# Patient Record
Sex: FEMALE | Race: BLACK OR AFRICAN AMERICAN | Employment: FULL TIME | URBAN - METROPOLITAN AREA
[De-identification: names, ages, dates, MRNs, and addresses within clinical notes are randomized per-mention and may not be internally consistent; named-entity substitution may affect disease eponyms.]

---

## 2018-01-31 ENCOUNTER — HOSPITAL ENCOUNTER (EMERGENCY)
Age: 38
Discharge: HOME | End: 2018-01-31
Attending: EMERGENCY MEDICINE
Payer: COMMERCIAL

## 2018-01-31 VITALS
HEART RATE: 94 BPM | RESPIRATION RATE: 14 BRPM | TEMPERATURE: 98.1 F | BODY MASS INDEX: 28.89 KG/M2 | SYSTOLIC BLOOD PRESSURE: 128 MMHG | DIASTOLIC BLOOD PRESSURE: 90 MMHG | HEIGHT: 65 IN | WEIGHT: 173.4 LBS | OXYGEN SATURATION: 100 %

## 2018-01-31 DIAGNOSIS — R11.2 NON-INTRACTABLE VOMITING WITH NAUSEA, UNSPECIFIED VOMITING TYPE: Primary | ICD-10-CM

## 2018-01-31 DIAGNOSIS — R55 POSTURAL DIZZINESS WITH PRESYNCOPE: ICD-10-CM

## 2018-01-31 DIAGNOSIS — R51.9 NONINTRACTABLE HEADACHE, UNSPECIFIED CHRONICITY PATTERN, UNSPECIFIED HEADACHE TYPE: ICD-10-CM

## 2018-01-31 DIAGNOSIS — R42 POSTURAL DIZZINESS WITH PRESYNCOPE: ICD-10-CM

## 2018-01-31 LAB
ALBUMIN SERPL-MCNC: 4.2 G/DL (ref 3.4–5)
ALBUMIN/GLOB SERPL: 1.2 {RATIO} (ref 0.8–1.7)
ALP SERPL-CCNC: 67 U/L (ref 45–117)
ALT SERPL-CCNC: 17 U/L (ref 13–56)
ANION GAP SERPL CALC-SCNC: 9 MMOL/L (ref 3–18)
APPEARANCE UR: CLEAR
AST SERPL-CCNC: 33 U/L (ref 15–37)
BASOPHILS # BLD: 0 K/UL (ref 0–0.06)
BASOPHILS NFR BLD: 0 % (ref 0–2)
BILIRUB SERPL-MCNC: 0.6 MG/DL (ref 0.2–1)
BILIRUB UR QL: NEGATIVE
BNP SERPL-MCNC: 23 PG/ML (ref 0–450)
BUN SERPL-MCNC: 9 MG/DL (ref 7–18)
BUN/CREAT SERPL: 13 (ref 12–20)
CALCIUM SERPL-MCNC: 9.2 MG/DL (ref 8.5–10.1)
CHLORIDE SERPL-SCNC: 106 MMOL/L (ref 100–108)
CK MB CFR SERPL CALC: NORMAL % (ref 0–4)
CK MB SERPL-MCNC: <1 NG/ML (ref 5–25)
CK SERPL-CCNC: 145 U/L (ref 26–192)
CO2 SERPL-SCNC: 26 MMOL/L (ref 21–32)
COLOR UR: YELLOW
CREAT SERPL-MCNC: 0.7 MG/DL (ref 0.6–1.3)
DIFFERENTIAL METHOD BLD: NORMAL
EOSINOPHIL # BLD: 0.3 K/UL (ref 0–0.4)
EOSINOPHIL NFR BLD: 3 % (ref 0–5)
ERYTHROCYTE [DISTWIDTH] IN BLOOD BY AUTOMATED COUNT: 13.2 % (ref 11.6–14.5)
GLOBULIN SER CALC-MCNC: 3.6 G/DL (ref 2–4)
GLUCOSE SERPL-MCNC: 100 MG/DL (ref 74–99)
GLUCOSE UR STRIP.AUTO-MCNC: NEGATIVE MG/DL
HCT VFR BLD AUTO: 41 % (ref 35–45)
HGB BLD-MCNC: 13.7 G/DL (ref 12–16)
HGB UR QL STRIP: NEGATIVE
KETONES UR QL STRIP.AUTO: NEGATIVE MG/DL
LEUKOCYTE ESTERASE UR QL STRIP.AUTO: NEGATIVE
LIPASE SERPL-CCNC: 91 U/L (ref 73–393)
LYMPHOCYTES # BLD: 2.8 K/UL (ref 0.9–3.6)
LYMPHOCYTES NFR BLD: 32 % (ref 21–52)
MAGNESIUM SERPL-MCNC: 2.3 MG/DL (ref 1.6–2.6)
MCH RBC QN AUTO: 30.4 PG (ref 24–34)
MCHC RBC AUTO-ENTMCNC: 33.4 G/DL (ref 31–37)
MCV RBC AUTO: 91.1 FL (ref 74–97)
MONOCYTES # BLD: 0.6 K/UL (ref 0.05–1.2)
MONOCYTES NFR BLD: 6 % (ref 3–10)
NEUTS SEG # BLD: 5.2 K/UL (ref 1.8–8)
NEUTS SEG NFR BLD: 59 % (ref 40–73)
NITRITE UR QL STRIP.AUTO: NEGATIVE
PH UR STRIP: 7 [PH] (ref 5–8)
PLATELET # BLD AUTO: 273 K/UL (ref 135–420)
PMV BLD AUTO: 9.5 FL (ref 9.2–11.8)
POTASSIUM SERPL-SCNC: 5.5 MMOL/L (ref 3.5–5.5)
PROT SERPL-MCNC: 7.8 G/DL (ref 6.4–8.2)
PROT UR STRIP-MCNC: NEGATIVE MG/DL
RBC # BLD AUTO: 4.5 M/UL (ref 4.2–5.3)
SODIUM SERPL-SCNC: 141 MMOL/L (ref 136–145)
SP GR UR REFRACTOMETRY: 1.01 (ref 1–1.03)
TROPONIN I SERPL-MCNC: <0.02 NG/ML (ref 0–0.06)
UROBILINOGEN UR QL STRIP.AUTO: 0.2 EU/DL (ref 0.2–1)
WBC # BLD AUTO: 8.8 K/UL (ref 4.6–13.2)

## 2018-01-31 PROCEDURE — 74011250637 HC RX REV CODE- 250/637: Performed by: EMERGENCY MEDICINE

## 2018-01-31 PROCEDURE — 80053 COMPREHEN METABOLIC PANEL: CPT | Performed by: EMERGENCY MEDICINE

## 2018-01-31 PROCEDURE — 83690 ASSAY OF LIPASE: CPT | Performed by: EMERGENCY MEDICINE

## 2018-01-31 PROCEDURE — 83880 ASSAY OF NATRIURETIC PEPTIDE: CPT | Performed by: EMERGENCY MEDICINE

## 2018-01-31 PROCEDURE — 82550 ASSAY OF CK (CPK): CPT | Performed by: EMERGENCY MEDICINE

## 2018-01-31 PROCEDURE — 96361 HYDRATE IV INFUSION ADD-ON: CPT

## 2018-01-31 PROCEDURE — 81003 URINALYSIS AUTO W/O SCOPE: CPT | Performed by: EMERGENCY MEDICINE

## 2018-01-31 PROCEDURE — 83735 ASSAY OF MAGNESIUM: CPT | Performed by: EMERGENCY MEDICINE

## 2018-01-31 PROCEDURE — 99285 EMERGENCY DEPT VISIT HI MDM: CPT

## 2018-01-31 PROCEDURE — 96374 THER/PROPH/DIAG INJ IV PUSH: CPT

## 2018-01-31 PROCEDURE — 93005 ELECTROCARDIOGRAM TRACING: CPT

## 2018-01-31 PROCEDURE — 74011250636 HC RX REV CODE- 250/636: Performed by: EMERGENCY MEDICINE

## 2018-01-31 PROCEDURE — 85025 COMPLETE CBC W/AUTO DIFF WBC: CPT | Performed by: EMERGENCY MEDICINE

## 2018-01-31 RX ORDER — METHOCARBAMOL 500 MG/1
TABLET, FILM COATED ORAL 4 TIMES DAILY
COMMUNITY

## 2018-01-31 RX ORDER — ONDANSETRON 4 MG/1
4 TABLET, FILM COATED ORAL
Qty: 12 TAB | Refills: 0 | Status: SHIPPED | OUTPATIENT
Start: 2018-01-31

## 2018-01-31 RX ORDER — METHOCARBAMOL 500 MG/1
500 TABLET, FILM COATED ORAL
COMMUNITY
Start: 2018-01-24 | End: 2018-01-31 | Stop reason: SDUPTHER

## 2018-01-31 RX ORDER — ONDANSETRON 2 MG/ML
4 INJECTION INTRAMUSCULAR; INTRAVENOUS
Status: COMPLETED | OUTPATIENT
Start: 2018-01-31 | End: 2018-01-31

## 2018-01-31 RX ORDER — NAPROXEN 500 MG/1
500 TABLET ORAL
COMMUNITY
Start: 2017-09-06

## 2018-01-31 RX ORDER — FUROSEMIDE 20 MG/1
20 TABLET ORAL AS NEEDED
COMMUNITY
Start: 2017-04-25 | End: 2018-04-25

## 2018-01-31 RX ORDER — ESCITALOPRAM OXALATE 20 MG/1
20 TABLET ORAL DAILY
COMMUNITY
Start: 2018-01-17 | End: 2018-07-16

## 2018-01-31 RX ORDER — LORAZEPAM 1 MG/1
0.5 TABLET ORAL
Status: COMPLETED | OUTPATIENT
Start: 2018-01-31 | End: 2018-01-31

## 2018-01-31 RX ADMIN — ONDANSETRON 4 MG: 2 INJECTION INTRAMUSCULAR; INTRAVENOUS at 07:52

## 2018-01-31 RX ADMIN — SODIUM CHLORIDE 1000 ML: 900 INJECTION, SOLUTION INTRAVENOUS at 07:52

## 2018-01-31 RX ADMIN — LORAZEPAM 0.5 MG: 1 TABLET ORAL at 07:52

## 2018-01-31 NOTE — ED NOTES
Pt resting comfortably. Administered medications per MAR. Advise boyfriend will drive pt home. No further needs expressed.

## 2018-01-31 NOTE — ED NOTES
Discharge instructions reviewed with opportunity for questions provided. Pt vocalized understanding. Armband removed and shredded. Pt stable condition at time of discharge. Pt's sister here for transport home.

## 2018-01-31 NOTE — ED TRIAGE NOTES
PAtient arrives to ED in wheelchair stating headache for 2 days and \"passed out\" witnessed by significant other this morning. Pt reports she thinks she may have hit her head and high BP (121/92 and 141/100) this morning. Pt states she took an anxiety pill PTA.

## 2018-01-31 NOTE — ED PROVIDER NOTES
[facility]  EMERGENCY DEPARTMENT HISTORY AND PHYSICAL EXAM    Date: 1/31/2018  Patient Name: Isabel Johnson  YOB: 1980  Medical Record Number: [id number]     History of Presenting Illness     Chief Complaint   Patient presents with    Headache       History Provided By: Patient    Chief Complaint: Headache  Duration: 2 Days  Timing:  Constant and Worsening  Location: Generalized head  Quality: Aching  Severity: 8 out of 10  Modifying Factors: No relieving or worsening factors  Associated Symptoms: Dizziness, generalized weakness, N/V, shakiness, SOB, bilateral visual disturbance (\"my vision just goes out, I don't know which eye, but when I close one or the other I can see\")   Also c/o ear pain, decreased urine output, increased stress, and recent medication changes. Additional History (Context):   6:31 AM  Isabel Johnson is a 44 y.o. female with PMHX asthma, anxiety, migraines, and lupus (dx 17 months ago), who presents to the emergency department C/O gradually worsening headache onset 2 days ago. Associated symptoms include dizziness, generalized weakness, N/V, shakiness, SOB, photophobia, bilateral visual disturbance (\"my vision just goes out, I don't know which eye, but when I close one or the other I can see\"). States sxs are consistent with hx of migraines without any atypical factors. Reports she was told she had \"passed out\" as witnessed by pt's significant other. Also notes she has elevated BP (141/100 at home, 154/97 in ED). Reports she has had these episodes of elevated BP in the past, but is not rx any medication for HTN. Last use of steroids was 1 month ago, had 30 day taper. Pt also notes decreased urine output, urinates ~2x a day, states she was told she is concerning for kidney dz. Pt also c/o increased stressors in her life and recent medication changes (updated list of current medication listed).  Also c/o right ear pain s/p ruptured ear drum, which had improved with healing. Pt had taken her Lexapro PTA. Pt denies fever, chills, body aches, diarrhea, facial droop, blurred vision, slurred speech, numbness, tingling, weakness, large caffeine intake, use of OTC cough/cold medicines, and any other Sx or complaints. Shx: +tobacco use (6 cigarettes/day), -EtOH use, +illicit drug use (marijuana, last was a few weeks ago)    PCP: Dheeraj Sparks NP  Specialist: Recently started seeing a rheumatologist for her hx lupus    Current Outpatient Prescriptions   Medication Sig Dispense Refill           escitalopram oxalate (LEXAPRO) 20 mg tablet Take 20 mg by mouth daily.  furosemide (LASIX) 20 mg tablet Take 20 mg by mouth as needed.  naproxen (NAPROSYN) 500 mg tablet Take 500 mg by mouth two (2) times daily as needed.  methocarbamol (ROBAXIN) 500 mg tablet Take 500 mg by mouth three (3) times daily as needed. Past History     Past Medical History:  Past Medical History:   Diagnosis Date    Anxiety     Asthma     Pseudoseizure        Past Surgical History:  Past Surgical History:   Procedure Laterality Date    HX BREAST AUGMENTATION      HX GYN      ablation    HX GYN      fallopian tube removed       Family History:  History reviewed. No pertinent family history. Social History:  Social History   Substance Use Topics    Smoking status: Current Every Day Smoker     Packs/day: 0.25    Smokeless tobacco: Never Used    Alcohol use No       Allergies: Allergies   Allergen Reactions    Latex Rash    Percocet [Oxycodone-Acetaminophen] Itching     \"and twitching\"         Review of Systems     Review of Systems   Constitutional: Negative for chills, diaphoresis, fever and unexpected weight change. (+) shakiness   HENT: Positive for ear pain. Negative for congestion, drooling, rhinorrhea, sore throat, tinnitus and trouble swallowing. Eyes: Positive for photophobia and visual disturbance.  Negative for pain and redness. Respiratory: Positive for shortness of breath. Negative for cough, choking, chest tightness, wheezing and stridor. Cardiovascular: Negative for chest pain, palpitations and leg swelling. Gastrointestinal: Positive for nausea and vomiting. Negative for abdominal distention, abdominal pain, anal bleeding, blood in stool, constipation and diarrhea. Genitourinary: Positive for decreased urine volume. Negative for difficulty urinating, dysuria, flank pain, frequency, hematuria and urgency. Musculoskeletal: Negative for arthralgias, back pain, myalgias and neck pain. Skin: Negative for color change, rash and wound. Neurological: Positive for dizziness, syncope (\"passed out\") and headaches. Negative for seizures, facial asymmetry, speech difficulty and light-headedness. Hematological: Does not bruise/bleed easily. Psychiatric/Behavioral: Negative for agitation, behavioral problems, hallucinations, self-injury and suicidal ideas. The patient is not hyperactive. Physical Exam     Vitals:    01/31/18 0645 01/31/18 0650 01/31/18 0730 01/31/18 0800   BP: 135/90 (!) 135/101 121/70 128/90   Pulse: 100 (!) 102 96 94   Resp: 24 16 15 14   Temp:       SpO2:   100% 100%   Weight:       Height:         Physical Exam   Constitutional: She is oriented to person, place, and time. She appears well-developed and well-nourished. Non-toxic appearance. No distress. HENT:   Head: Normocephalic and atraumatic. Right Ear: External ear normal. Tympanic membrane is not perforated. Left Ear: Tympanic membrane and external ear normal.   Mouth/Throat: Oropharynx is clear and moist. No oropharyngeal exudate. Right ear drum with punctate dried red blood to lower TM at 5 o'clock position without visible perforation   Eyes: Conjunctivae and EOM are normal. Pupils are equal, round, and reactive to light. No scleral icterus. No pallor   Neck: Normal range of motion. Neck supple. No JVD present.  No tracheal deviation present. No thyromegaly present. Cardiovascular: Normal rate, regular rhythm and normal heart sounds. Pulmonary/Chest: Effort normal and breath sounds normal. No stridor. No respiratory distress. Abdominal: Soft. Bowel sounds are normal. She exhibits no distension. There is no tenderness. There is no rebound and no guarding. Musculoskeletal: Normal range of motion. She exhibits no edema or tenderness. No soft tissue injuries   Lymphadenopathy:     She has no cervical adenopathy. Neurological: She is alert and oriented to person, place, and time. She has normal reflexes. No cranial nerve deficit. Coordination normal.   Skin: Skin is warm and dry. No rash noted. She is not diaphoretic. No erythema. Scattered tattoos good quality   Psychiatric: Her behavior is normal. Judgment and thought content normal. Her mood appears anxious. Nursing note and vitals reviewed. Diagnostic Study Results     Labs -     Recent Results (from the past 12 hour(s))   CBC WITH AUTOMATED DIFF    Collection Time: 01/31/18  7:00 AM   Result Value Ref Range    WBC 8.8 4.6 - 13.2 K/uL    RBC 4.50 4. 20 - 5.30 M/uL    HGB 13.7 12.0 - 16.0 g/dL    HCT 41.0 35.0 - 45.0 %    MCV 91.1 74.0 - 97.0 FL    MCH 30.4 24.0 - 34.0 PG    MCHC 33.4 31.0 - 37.0 g/dL    RDW 13.2 11.6 - 14.5 %    PLATELET 686 250 - 053 K/uL    MPV 9.5 9.2 - 11.8 FL    NEUTROPHILS 59 40 - 73 %    LYMPHOCYTES 32 21 - 52 %    MONOCYTES 6 3 - 10 %    EOSINOPHILS 3 0 - 5 %    BASOPHILS 0 0 - 2 %    ABS. NEUTROPHILS 5.2 1.8 - 8.0 K/UL    ABS. LYMPHOCYTES 2.8 0.9 - 3.6 K/UL    ABS. MONOCYTES 0.6 0.05 - 1.2 K/UL    ABS. EOSINOPHILS 0.3 0.0 - 0.4 K/UL    ABS.  BASOPHILS 0.0 0.0 - 0.06 K/UL    DF AUTOMATED     METABOLIC PANEL, COMPREHENSIVE    Collection Time: 01/31/18  7:00 AM   Result Value Ref Range    Sodium 141 136 - 145 mmol/L    Potassium 5.5 3.5 - 5.5 mmol/L    Chloride 106 100 - 108 mmol/L    CO2 26 21 - 32 mmol/L    Anion gap 9 3.0 - 18 mmol/L Glucose 100 (H) 74 - 99 mg/dL    BUN 9 7.0 - 18 MG/DL    Creatinine 0.70 0.6 - 1.3 MG/DL    BUN/Creatinine ratio 13 12 - 20      GFR est AA >60 >60 ml/min/1.73m2    GFR est non-AA >60 >60 ml/min/1.73m2    Calcium 9.2 8.5 - 10.1 MG/DL    Bilirubin, total 0.6 0.2 - 1.0 MG/DL    ALT (SGPT) 17 13 - 56 U/L    AST (SGOT) 33 15 - 37 U/L    Alk.  phosphatase 67 45 - 117 U/L    Protein, total 7.8 6.4 - 8.2 g/dL    Albumin 4.2 3.4 - 5.0 g/dL    Globulin 3.6 2.0 - 4.0 g/dL    A-G Ratio 1.2 0.8 - 1.7     LIPASE    Collection Time: 01/31/18  7:00 AM   Result Value Ref Range    Lipase 91 73 - 393 U/L   MAGNESIUM    Collection Time: 01/31/18  7:00 AM   Result Value Ref Range    Magnesium 2.3 1.6 - 2.6 mg/dL   CARDIAC PANEL,(CK, CKMB & TROPONIN)    Collection Time: 01/31/18  7:00 AM   Result Value Ref Range     26 - 192 U/L    CK - MB <1.0 <3.6 ng/ml    CK-MB Index  0.0 - 4.0 %     CALCULATION NOT PERFORMED WHEN RESULT IS BELOW LINEAR LIMIT    Troponin-I, Qt. <0.02 0.00 - 0.06 NG/ML   NT-PRO BNP    Collection Time: 01/31/18  7:00 AM   Result Value Ref Range    NT pro-BNP 23 0 - 450 PG/ML   URINALYSIS W/ RFLX MICROSCOPIC    Collection Time: 01/31/18  7:15 AM   Result Value Ref Range    Color YELLOW      Appearance CLEAR      Specific gravity 1.009 1.005 - 1.030      pH (UA) 7.0 5.0 - 8.0      Protein NEGATIVE  NEG mg/dL    Glucose NEGATIVE  NEG mg/dL    Ketone NEGATIVE  NEG mg/dL    Bilirubin NEGATIVE  NEG      Blood NEGATIVE  NEG      Urobilinogen 0.2 0.2 - 1.0 EU/dL    Nitrites NEGATIVE  NEG      Leukocyte Esterase NEGATIVE  NEG     EKG, 12 LEAD, INITIAL    Collection Time: 01/31/18  7:20 AM   Result Value Ref Range    Ventricular Rate 97 BPM    Atrial Rate 97 BPM    P-R Interval 178 ms    QRS Duration 90 ms    Q-T Interval 376 ms    QTC Calculation (Bezet) 477 ms    Calculated P Axis 53 degrees    Calculated R Axis 39 degrees    Calculated T Axis 59 degrees    Diagnosis       Normal sinus rhythm  Normal ECG  When compared with ECG of 25-NOV-2015 08:55,  No significant change was found         Radiologic Studies -   No orders to display     CT Results  (Last 48 hours)    None        CXR Results  (Last 48 hours)    None          Medications Given in the ED:  Medications   sodium chloride 0.9 % bolus infusion 1,000 mL (1,000 mL IntraVENous New Bag 1/31/18 0752)   ondansetron (ZOFRAN) injection 4 mg (4 mg IntraVENous Given 1/31/18 0752)   LORazepam (ATIVAN) tablet 0.5 mg (0.5 mg Oral Given 1/31/18 0752)        Medical Decision Making     I am the first provider for this patient. I reviewed the vital signs, available nursing notes, past medical history, past surgical history, family history and social history. Records Reviewed: Nursing Notes and Old Medical Records    Vital Signs-Reviewed the patient's vital signs. Patient Vitals for the past 12 hrs:   Temp Pulse Resp BP SpO2   01/31/18 0800 - 94 14 128/90 100 %   01/31/18 0730 - 96 15 121/70 100 %   01/31/18 0650 - (!) 102 16 (!) 135/101 -   01/31/18 0645 - 100 24 135/90 -   01/31/18 0640 - (!) 106 19 147/78 100 %   01/31/18 0630 - (!) 112 13 137/87 100 %   01/31/18 0628 98.1 °F (36.7 °C) (!) 103 14 (!) 154/97 100 %       Provider Notes (Medical Decision Making):   DDx: chronic lupus with chronic joint pains, N/V, HA, visual complaints, but also with recent increase in life stress and associated syncopal episode tonight without known aggravating factors. Highly unlikely this is a new arhythmia, but given lupus in spite of no physical exam findings including discoid rash, will check labs and tx sxs. Pulse Oximetry Analysis - Normal 100% on RA        Cardiac Monitor:   Rate: 100  Rhythm: Normal Sinus Rhythm       EKG interpretation: (Preliminary)  Rhythm: NSR. Rate (approx.): 97 bpm. QRS duration of 90 ms. EKG read by Giovani Hughes MD at 7:20 AM     Procedures:  Procedures     ED Course:   6:31 AM Initial assessment performed.  The patients presenting problems have been discussed, and they are in agreement with the care plan formulated and outlined with them. Offering no questions or concerns at this time. SIGN OUT:  8:46 AM  Patient's presentation, labs/imaging and plan of care was reviewed with Lesly Dennis MD as part of sign out. They will await lab results as part of the plan discussed with the patient. Lesly Dennis MD's assistance in completion of this plan is greatly appreciated but it should be noted that I will be the provider of record for this patient. 8:51 AM   Labs state potassium is 3.5, but labs state 3+ hemolyzed. Sxs resolved. Pt comfortable with OP management. Hx is the same as above. Diagnosis and Disposition       Discharge Note:  8:47 AM  Enid Hurtado's  results have been reviewed with her. She has been counseled regarding her diagnosis, treatment, and plan. She verbally conveys understanding and agreement of the signs, symptoms, diagnosis, treatment and prognosis and additionally agrees to follow up as discussed. She also agrees with the care-plan and conveys that all of her questions have been answered. I have also provided discharge instructions for her that include: educational information regarding their diagnosis and treatment, and list of reasons why they would want to return to the ED prior to their follow-up appointment, should her condition change. She has been provided with education for proper emergency department utilization. Clinical Impression:    1. Non-intractable vomiting with nausea, unspecified vomiting type    2. Nonintractable headache, unspecified chronicity pattern, unspecified headache type    3. Postural dizziness with presyncope        PLAN:  1. D/C Home  2. Well appearing at discharge. Informed patient of reassuring lab results at discharge. No distress. Symptoms improving at discharge. Nausea improved with treatment.   Education on K+ of 5.5 most likely hemolysis, but recommend follow-up with PCM for a repeat. No distress. Return prec given. Patient comfortable with plan and reasons to return. Questions answered for patient who expressed no additional questions at time of discharge  Current Discharge Medication List      START taking these medications    Details   ondansetron hcl (ZOFRAN, AS HYDROCHLORIDE,) 4 mg tablet Take 1 Tab by mouth every eight (8) hours as needed for Nausea for up to 12 doses. Qty: 12 Tab, Refills: 0           3. Follow-up Information     Follow up With Details Comments Contact Info    [facility] [place] Schedule an appointment as soon as possible for a visit For Primary Care Follow Up [address] [place] [address]      [facility] EMERGENCY DEPT Go to If symptoms worsen, As needed [address] [facility] [id number] [phone]        _______________________________    Attestations: This note is prepared by Crissy Red, acting as Scribe for Sharyn Breaux. Yuly Good MD.    Sharyn Breaux. Yuly Good MD:  The scribe's documentation has been prepared under my direction and personally reviewed by me in its entirety.   I confirm that the note above accurately reflects all work, treatment, procedures, and medical decision making performed by me.  _______________________________

## 2018-02-10 LAB
ATRIAL RATE: 97 BPM
CALCULATED P AXIS, ECG09: 53 DEGREES
CALCULATED R AXIS, ECG10: 39 DEGREES
CALCULATED T AXIS, ECG11: 59 DEGREES
DIAGNOSIS, 93000: NORMAL
P-R INTERVAL, ECG05: 178 MS
Q-T INTERVAL, ECG07: 376 MS
QRS DURATION, ECG06: 90 MS
QTC CALCULATION (BEZET), ECG08: 477 MS
VENTRICULAR RATE, ECG03: 97 BPM